# Patient Record
Sex: FEMALE | Race: WHITE | NOT HISPANIC OR LATINO | URBAN - METROPOLITAN AREA
[De-identification: names, ages, dates, MRNs, and addresses within clinical notes are randomized per-mention and may not be internally consistent; named-entity substitution may affect disease eponyms.]

---

## 2018-03-06 NOTE — PATIENT DISCUSSION
(T15. 12XA) Foreign body in conjunctival sac, left eye, init encntr - Assesment : Examination revealed foreign body. Eyelash in meibomian gland OS. Removed at slit lamp with jewelers. - Plan : Recommended Ats q 1-2 hours until symptoms resolve. Refresh coupon given. Call if symptoms worsen, do not improve, or new symptoms or vision changes occur.

## 2018-05-30 ENCOUNTER — IMPORTED ENCOUNTER (OUTPATIENT)
Dept: URBAN - METROPOLITAN AREA CLINIC 9 | Facility: CLINIC | Age: 68
End: 2018-05-30

## 2019-06-04 ENCOUNTER — IMPORTED ENCOUNTER (OUTPATIENT)
Dept: URBAN - METROPOLITAN AREA CLINIC 9 | Facility: CLINIC | Age: 69
End: 2019-06-04

## 2020-06-17 ENCOUNTER — IMPORTED ENCOUNTER (OUTPATIENT)
Dept: URBAN - METROPOLITAN AREA CLINIC 9 | Facility: CLINIC | Age: 70
End: 2020-06-17

## 2021-06-23 ENCOUNTER — IMPORTED ENCOUNTER (OUTPATIENT)
Dept: URBAN - METROPOLITAN AREA CLINIC 9 | Facility: CLINIC | Age: 71
End: 2021-06-23

## 2021-06-23 PROBLEM — H43.813: Noted: 2021-06-23

## 2021-06-23 PROBLEM — H16.223: Noted: 2021-06-23

## 2021-06-23 PROBLEM — H04.123: Noted: 2021-06-23

## 2021-06-23 PROBLEM — H25.13: Noted: 2021-06-23

## 2021-10-16 ASSESSMENT — VISUAL ACUITY
OD_SC: CF 2FT SN
OD_CC: 20/40 SN
OS_CC: 20/40 SN
OD_CC: 20/25 - SN
OS_CC: 20/25 - SN
OS_CC: 20/25 SN
OD_CC: 20/25 SN
OS_CC: 20/25 SN
OS_CC: 20/25 - SN
OS_SC: CF 2FT SN
OD_CC: 20/25 - SN
OD_CC: 20/20 -2 SN

## 2021-10-16 ASSESSMENT — TONOMETRY
OD_IOP_MMHG: 10
OS_IOP_MMHG: 11
OD_IOP_MMHG: 16
OS_IOP_MMHG: 16
OD_IOP_MMHG: 14
OS_IOP_MMHG: 10
OD_IOP_MMHG: 13
OS_IOP_MMHG: 13

## 2022-06-29 ENCOUNTER — ESTABLISHED PATIENT (OUTPATIENT)
Dept: URBAN - METROPOLITAN AREA CLINIC 14 | Facility: CLINIC | Age: 72
End: 2022-06-29

## 2022-06-29 DIAGNOSIS — H35.373: ICD-10-CM

## 2022-06-29 DIAGNOSIS — H04.123: ICD-10-CM

## 2022-06-29 DIAGNOSIS — H25.13: ICD-10-CM

## 2022-06-29 DIAGNOSIS — H52.13: ICD-10-CM

## 2022-06-29 DIAGNOSIS — H16.223: ICD-10-CM

## 2022-06-29 DIAGNOSIS — H43.813: ICD-10-CM

## 2022-06-29 PROCEDURE — 92015 DETERMINE REFRACTIVE STATE: CPT

## 2022-06-29 PROCEDURE — 92310B CONTACT LEN 60

## 2022-06-29 PROCEDURE — 92134 CPTRZ OPH DX IMG PST SGM RTA: CPT

## 2022-06-29 PROCEDURE — 92014 COMPRE OPH EXAM EST PT 1/>: CPT

## 2022-06-29 ASSESSMENT — VISUAL ACUITY
OD_CC: 20/40
OS_CC: 20/40+2

## 2022-06-29 ASSESSMENT — TONOMETRY
OS_IOP_MMHG: 10
OD_IOP_MMHG: 9

## 2023-08-23 ENCOUNTER — ESTABLISHED PATIENT (OUTPATIENT)
Dept: URBAN - METROPOLITAN AREA CLINIC 14 | Facility: CLINIC | Age: 73
End: 2023-08-23

## 2023-08-23 DIAGNOSIS — H16.223: ICD-10-CM

## 2023-08-23 DIAGNOSIS — H25.13: ICD-10-CM

## 2023-08-23 DIAGNOSIS — H35.373: ICD-10-CM

## 2023-08-23 DIAGNOSIS — H43.813: ICD-10-CM

## 2023-08-23 DIAGNOSIS — H04.123: ICD-10-CM

## 2023-08-23 PROCEDURE — 92310B CONTACT LEN 60

## 2023-08-23 PROCEDURE — 92015 DETERMINE REFRACTIVE STATE: CPT

## 2023-08-23 PROCEDURE — 92014 COMPRE OPH EXAM EST PT 1/>: CPT

## 2023-08-23 PROCEDURE — 92134 CPTRZ OPH DX IMG PST SGM RTA: CPT

## 2023-08-23 ASSESSMENT — TONOMETRY
OS_IOP_MMHG: 9
OD_IOP_MMHG: 11

## 2023-08-23 ASSESSMENT — VISUAL ACUITY
OS_SC: 20/400
OD_PH: 20/60
OS_PH: 20/100
OD_SC: 20/300
OD_SC: J1+
OS_SC: J1+

## 2024-09-03 ENCOUNTER — COMPREHENSIVE EXAM (OUTPATIENT)
Facility: LOCATION | Age: 74
End: 2024-09-03

## 2024-09-03 DIAGNOSIS — H16.223: ICD-10-CM

## 2024-09-03 DIAGNOSIS — H25.13: ICD-10-CM

## 2024-09-03 DIAGNOSIS — H35.373: ICD-10-CM

## 2024-09-03 DIAGNOSIS — H52.13: ICD-10-CM

## 2024-09-03 DIAGNOSIS — H04.123: ICD-10-CM

## 2024-09-03 DIAGNOSIS — H43.813: ICD-10-CM

## 2024-09-03 PROCEDURE — 92015 DETERMINE REFRACTIVE STATE: CPT

## 2024-09-03 PROCEDURE — 92014 COMPRE OPH EXAM EST PT 1/>: CPT

## 2024-09-03 PROCEDURE — 92134 CPTRZ OPH DX IMG PST SGM RTA: CPT
